# Patient Record
Sex: FEMALE | Race: OTHER | HISPANIC OR LATINO | Employment: UNEMPLOYED | URBAN - METROPOLITAN AREA
[De-identification: names, ages, dates, MRNs, and addresses within clinical notes are randomized per-mention and may not be internally consistent; named-entity substitution may affect disease eponyms.]

---

## 2020-03-31 ENCOUNTER — HOSPITAL ENCOUNTER (EMERGENCY)
Facility: HOSPITAL | Age: 40
Discharge: HOME | End: 2020-03-31
Attending: EMERGENCY MEDICINE | Admitting: EMERGENCY MEDICINE
Payer: COMMERCIAL

## 2020-03-31 VITALS
TEMPERATURE: 99.1 F | RESPIRATION RATE: 16 BRPM | DIASTOLIC BLOOD PRESSURE: 74 MMHG | WEIGHT: 130.51 LBS | HEART RATE: 95 BPM | SYSTOLIC BLOOD PRESSURE: 144 MMHG | OXYGEN SATURATION: 100 %

## 2020-03-31 DIAGNOSIS — J06.9 VIRAL UPPER RESPIRATORY TRACT INFECTION: Primary | ICD-10-CM

## 2020-03-31 PROCEDURE — 99284 EMERGENCY DEPT VISIT MOD MDM: CPT

## 2020-03-31 PROCEDURE — 87635 SARS-COV-2 COVID-19 AMP PRB: CPT | Performed by: EMERGENCY MEDICINE

## 2020-03-31 PROCEDURE — 99284 EMERGENCY DEPT VISIT MOD MDM: CPT | Performed by: EMERGENCY MEDICINE

## 2020-04-03 LAB — SARS-COV-2 RNA SPEC QL NAA+PROBE: DETECTED

## 2023-02-07 ENCOUNTER — OFFICE VISIT (OUTPATIENT)
Dept: DENTISTRY | Facility: CLINIC | Age: 43
End: 2023-02-07

## 2023-02-07 VITALS — TEMPERATURE: 98.1 F | DIASTOLIC BLOOD PRESSURE: 64 MMHG | HEART RATE: 82 BPM | SYSTOLIC BLOOD PRESSURE: 117 MMHG

## 2023-02-07 DIAGNOSIS — Z01.21 ENCOUNTER FOR DENTAL EXAMINATION AND CLEANING WITH ABNORMAL FINDINGS: Primary | ICD-10-CM

## 2023-02-07 NOTE — DENTAL PROCEDURE DETAILS
Miguel Angel Botello presents for a Comprehensive exam  Verbal consent for treatment given in addition to the forms  Reviewed health history - Patient is ASA I  Consents signed: Yes     Perio: Moderate bleeding  Pain Scale: 1 ( # 19 area only)  Caries Assessment: Medium  Radiographs: Complete mouth series     Oral Hygiene instruction reviewed and given  Recommended Hygiene recall visits with the [address] [place] [address]  Treatment Plan:  1  Infection control: referred for   2  Periodontal therapy: adult prophy/ ScRp  3  Caries control: as charted  4  Occlusal evaluation:   5  Case Difficulty Type 1  Prognosis is Good  Referrals needed: No  Next Visit:  Filling # 19  Next Next Visit : SRP

## 2023-02-07 NOTE — PROGRESS NOTES
Comprehensive Exam    Dhruv Port Lions presents for a comprehensive exam  Verbal consent for treatment given in addition to the forms  Reviewed health history - Patient is ASA  : I  Consents signed: Yes    Perio: Moderate bleeding  Pain Scale: 2 ( # 19 area only when eating)  Caries Assessment: HIGH  Radiographs: FMX,  Perio probing done  Oral Hygiene instruction reviewed and given  Radiographic and clinical examination reveal a broken composite resin filling on # 19 MO, with some discomfort when pt  Is eating  Perio probing also reveals areas of moderate bleeding due to perio problemes  Pt  Will benefit from SRP all quadrants soon  Pt  Declined to get the broken piece of the resin filling on # 19 off now but requested an early dental appointment for that   Nv  Fillings # 19 and # 20    NNV: SRP  Estela Cushing Hygiene recall visits recommended to the patient  Treatment Plan:  1  Infection control  2  Periodontal therapy:  3  Caries control:  4  Occlusal evaluation    Prognosis is Good  Referrals needed: No  Next visit: Fillings # 19 and # 20    NNV: SRP

## 2023-03-30 ENCOUNTER — OFFICE VISIT (OUTPATIENT)
Dept: DENTISTRY | Facility: CLINIC | Age: 43
End: 2023-03-30

## 2023-03-30 VITALS — HEART RATE: 82 BPM | DIASTOLIC BLOOD PRESSURE: 75 MMHG | SYSTOLIC BLOOD PRESSURE: 114 MMHG | TEMPERATURE: 98.2 F

## 2023-03-30 DIAGNOSIS — K05.6 PERIODONTAL DISEASE: Primary | ICD-10-CM

## 2023-03-30 NOTE — DENTAL PROCEDURE DETAILS
SCALING AND ROOT PLANING     ASA:  I  Pain:  0  Reviewed M/H    SC/RP:    UL/LL   Quad scaling and root planning  Applied Topical Anesthetic,   Administered  __2_ carpules,  Long Needle, Lidocaine HCL 2 % and Epi 1 7 mL 1:100,000,     Type of Treatment:  Used Ultrasonic and Hand Scaling, Flossed, Polished, Subgingival Irrigation - post tx - Chlorhexidine  Reviewed OHI  - Brush 2X/day and Floss 1X/day    Oral Hygiene:  Poor   Plaque: Moderate    Calculus: Moderate /  Heavy  localized  Bleeding: Moderate     Stain:  None    Treatment Plan:  no changes to tx plan       Dr  Exam:  Resident Dr Moo Smiley  gave anesthesia today                 Referral:  No referral given  Gave follow-up directions      NV1:  SRP's UR/LR - 75 min  NV2:  Rest 19 - MO, 20 - DO - 60 min  NV3:  assessment - post SRP eval - 45 min - 4 - 6 weeks after right side SRP completed

## 2023-04-04 ENCOUNTER — OFFICE VISIT (OUTPATIENT)
Dept: DENTISTRY | Facility: CLINIC | Age: 43
End: 2023-04-04

## 2023-04-04 VITALS — HEART RATE: 74 BPM | SYSTOLIC BLOOD PRESSURE: 112 MMHG | DIASTOLIC BLOOD PRESSURE: 67 MMHG | TEMPERATURE: 97.7 F

## 2023-04-04 DIAGNOSIS — Z01.20 ENCOUNTER FOR DENTAL EXAMINATION: Primary | ICD-10-CM

## 2023-04-04 NOTE — DENTAL PROCEDURE DETAILS
SCALING AND ROOT PLANING     ASA:  I  Pain:   0  Reviewed M/H    SC/RP:   UR/LR   Quad scaling and root planning  Applied Topical Anesthetic,   Administered  __2_ carpules,  Long Needle, Lidocaine HCL 2 % and Epi 1 7 mL 1:100,000,    Infiltration, IANB   Type of Treatment:  Used Ultrasonic and Hand Scaling, Flossed, Polished, Subgingival Irrigation - post tx - Chlorhexidine  Reviewed OHI  - Brush 2X/day and Floss 1X/day    Oral Hygiene:  Poor   Plaque: Moderate    Calculus: Moderate   Bleeding: Moderate     Stain:  None      Treatment Plan:  no changes to tx plan       Dr  Exam:  Resident Dr Saumya Woodruff  gave anesthesia today                 Referral:  No referral given  Gave follow-up directions      NV1:  post SRP eval - 45 min  NV2:  Rest 19, 20  NV3:  3 month perio maintenance - 60 min

## 2023-05-05 ENCOUNTER — OFFICE VISIT (OUTPATIENT)
Dept: DENTISTRY | Facility: CLINIC | Age: 43
End: 2023-05-05

## 2023-05-05 VITALS — HEART RATE: 87 BPM | DIASTOLIC BLOOD PRESSURE: 71 MMHG | SYSTOLIC BLOOD PRESSURE: 110 MMHG

## 2023-05-05 DIAGNOSIS — K08.50 DEFECTIVE DENTAL RESTORATION: ICD-10-CM

## 2023-05-05 DIAGNOSIS — K02.9 CARIES: Primary | ICD-10-CM

## 2023-05-08 NOTE — PROGRESS NOTES
Composite Filling    Adelaida D Avi Leyden presents for composite filling  PMH reviewed, no changes  ASA I  No pain     Discussed with patient need for RCT if pulp exposure occurs or in future if pulp is inflamed  Pt understands and consents  Applied topical benzocaine, administered 1 carps 2% lido 1:100k epi via IANB     Prepped tooth #20 DO and 19 MO with 245 carbide on high speed  Caries removed with round carbide on slow speed  Once the existing restoration on #19 was removed and the day was excavated, a pulp exposure (pinpoint)  Occurred, radiographically the pulp horn was prominent  Placed bo system, calcium hydroxide, limelight and cured    Etch with 37% H2PO4, rinse, dry  Applied Adhese with 20 second scrub once, gentle air dry and light cured for 10s  Restored with Tetric bulk chele shade A2 and light cured  Post op radiograph obtained- composite on #20 is not densely packed at the apical portion of the box however is sealed and can be left to monitor at recall  Explained to pt that pulp exposure occurred and calcium hydroxide was placed, explained that if she experiences pain or discomfort after today RCT is indicated  Explained that we will wait to see how tooth responds to calcium hydroxide     Refined with finishing burs, polished with enhance point  Verified occlusion and contacts  Pt left satisfied

## 2023-05-18 ENCOUNTER — OFFICE VISIT (OUTPATIENT)
Dept: DENTISTRY | Facility: CLINIC | Age: 43
End: 2023-05-18

## 2023-05-18 VITALS — HEART RATE: 91 BPM | TEMPERATURE: 98.2 F | SYSTOLIC BLOOD PRESSURE: 120 MMHG | DIASTOLIC BLOOD PRESSURE: 84 MMHG

## 2023-05-18 DIAGNOSIS — Z01.20 ENCOUNTER FOR DENTAL EXAMINATION: Primary | ICD-10-CM

## 2023-05-18 NOTE — DENTAL PROCEDURE DETAILS
ASA  I  Pain - O  Reviewed M/DH    Pt presented for post SRP eval     ---FMP was completed  1-4 mm w/ slight bleeding  ---Homecare looks really good  Pt is motivated      No exam today  Referral: none    NV1:  Periodontal maintenance - 60 min

## 2023-06-14 ENCOUNTER — OFFICE VISIT (OUTPATIENT)
Dept: DENTISTRY | Facility: CLINIC | Age: 43
End: 2023-06-14

## 2023-06-14 VITALS — SYSTOLIC BLOOD PRESSURE: 132 MMHG | DIASTOLIC BLOOD PRESSURE: 84 MMHG | HEART RATE: 82 BPM

## 2023-06-14 DIAGNOSIS — K08.89 PAIN, DENTAL: Primary | ICD-10-CM

## 2023-06-14 PROCEDURE — D0220 INTRAORAL - PERIAPICAL FIRST RADIOGRAPHIC IMAGE: HCPCS | Performed by: DENTIST

## 2023-06-14 PROCEDURE — D0140 LIMITED ORAL EVALUATION - PROBLEM FOCUSED: HCPCS | Performed by: DENTIST

## 2023-06-14 RX ORDER — AMOXICILLIN 500 MG/1
500 CAPSULE ORAL EVERY 8 HOURS SCHEDULED
Qty: 21 CAPSULE | Refills: 0 | Status: SHIPPED | OUTPATIENT
Start: 2023-06-14 | End: 2023-06-21

## 2023-06-14 RX ORDER — IBUPROFEN 400 MG/1
400 TABLET ORAL EVERY 6 HOURS PRN
Qty: 20 TABLET | Refills: 0 | Status: SHIPPED | OUTPATIENT
Start: 2023-06-14

## 2023-06-14 NOTE — PROGRESS NOTES
"Rajiv Stone, 36 y o came with c/o pain in reln to LL side since a week  Pain level: 9  Med hx rvd: ASA I  NKDA      o/e:   e/o; wnl  I/o:  #19 comp fill, intact, v tender on percussion +++  Cold test postv ++(non lingering)  Pt informed that last note mentioned pin point pulp exposure as decay was v deep, pt  understands  PA shows deep fill close to pulp chamber, no pap seen  Dx: irrev pulpitis with acute apical periodontitis  Prognosis: fair   adv: rct #19     Offered to start palliative tr for #19 today, pt states she has to go to work , she will call if symptoms worsen     Prescribed antib and otc prn pain  Pt left the office comfortable and ambulatory  Nv\" Rct #19    "

## 2023-07-21 ENCOUNTER — OFFICE VISIT (OUTPATIENT)
Dept: DENTISTRY | Facility: CLINIC | Age: 43
End: 2023-07-21

## 2023-07-21 VITALS — DIASTOLIC BLOOD PRESSURE: 77 MMHG | HEART RATE: 87 BPM | SYSTOLIC BLOOD PRESSURE: 116 MMHG

## 2023-07-21 DIAGNOSIS — K04.7 DENTAL ABSCESS: Primary | ICD-10-CM

## 2023-07-21 PROCEDURE — D0140 LIMITED ORAL EVALUATION - PROBLEM FOCUSED: HCPCS | Performed by: DENTIST

## 2023-07-21 PROCEDURE — D0220 INTRAORAL - PERIAPICAL FIRST RADIOGRAPHIC IMAGE: HCPCS | Performed by: DENTIST

## 2023-07-21 RX ORDER — CLINDAMYCIN HYDROCHLORIDE 300 MG/1
300 CAPSULE ORAL 3 TIMES DAILY
Qty: 21 CAPSULE | Refills: 0 | Status: SHIPPED | OUTPATIENT
Start: 2023-07-21 | End: 2023-07-28

## 2023-07-21 NOTE — PROGRESS NOTES
Fe Petersen, 36 y.o came with c/o severe pain in reln to LL side since a few days  Pain level: 9  Med hx rvd: ASA I no change    O/e:   e/o: no facial swelling, mild tenderness Left submandibular lymph node    I/o;  #19 v tender on percussion, gr 3 mobility, Abscess in reln to Buccal vestibule, v tender on palpation. PA shows furca inv, parl m, d roots  Poor prognosis, adv extrn  Pt. Wants extrn under Sedation  Ref to OS given   and prescribed antib. And otc pain meds. Pt left the off comf.  And ambulatory     nv: sc/rp

## 2024-04-03 ENCOUNTER — TELEPHONE (OUTPATIENT)
Dept: DENTISTRY | Facility: CLINIC | Age: 44
End: 2024-04-03

## 2024-04-03 NOTE — TELEPHONE ENCOUNTER
Spoke to pt. about not having active coverage pt stated she will speak to  and call us back before visit-YYOLA

## 2025-03-03 ENCOUNTER — APPOINTMENT (OUTPATIENT)
Dept: URGENT CARE | Age: 45
End: 2025-03-03

## 2025-03-03 ENCOUNTER — APPOINTMENT (OUTPATIENT)
Dept: LAB | Age: 45
End: 2025-03-03